# Patient Record
Sex: MALE | Race: WHITE | URBAN - METROPOLITAN AREA
[De-identification: names, ages, dates, MRNs, and addresses within clinical notes are randomized per-mention and may not be internally consistent; named-entity substitution may affect disease eponyms.]

---

## 2018-07-17 ENCOUNTER — OFFICE VISIT - RIVER FALLS (OUTPATIENT)
Dept: FAMILY MEDICINE | Facility: CLINIC | Age: 33
End: 2018-07-17

## 2018-07-17 ASSESSMENT — MIFFLIN-ST. JEOR: SCORE: 2417.24

## 2018-08-07 ENCOUNTER — OFFICE VISIT - RIVER FALLS (OUTPATIENT)
Dept: FAMILY MEDICINE | Facility: CLINIC | Age: 33
End: 2018-08-07

## 2018-08-07 ASSESSMENT — MIFFLIN-ST. JEOR: SCORE: 2449.89

## 2022-02-12 VITALS
BODY MASS INDEX: 47.74 KG/M2 | TEMPERATURE: 99.3 F | HEART RATE: 80 BPM | HEIGHT: 68 IN | DIASTOLIC BLOOD PRESSURE: 74 MMHG | SYSTOLIC BLOOD PRESSURE: 130 MMHG | TEMPERATURE: 97.6 F | WEIGHT: 315 LBS | BODY MASS INDEX: 47.74 KG/M2 | HEIGHT: 68 IN | HEART RATE: 68 BPM | DIASTOLIC BLOOD PRESSURE: 82 MMHG | WEIGHT: 315 LBS | SYSTOLIC BLOOD PRESSURE: 116 MMHG

## 2022-02-15 NOTE — PROGRESS NOTES
Patient:   GALA ROBERSON            MRN: [id number]            FIN: [id number]               Age:   32 years     Sex:  Male     :  1985   Associated Diagnoses:   Laryngitis   Author:   Charlie Taylor MD      Chief Complaint   2018 9:21 AM CDT    Lost voice x 2 days     Chief complaint and symptoms noted above confirmed with patient.      Subjective   Chief complaint 2018 9:21 AM CDT    Lost voice x 2 days   Noted as above. Discussed with Patient..  Additional information no fever or chills..        Health Status   Allergies:    Allergic Reactions (Selected)  Severity Not Documented  CeleBREX (Hives)   Medications:  (Selected)   Documented Medications  Documented  Jardiance: Oral, qam, 0 Refill(s), Type: Maintenance  Victoza: ( 1.2 mg ), Subcutaneous, daily, 0 Refill(s), Type: Maintenance  lisinopril: Oral, daily, 0 Refill(s), Type: Maintenance  metFORMIN: Oral, 0 Refill(s), Type: Maintenance   Problem list:    All Problems  Morbid obesity / SNOMED CT 904061887 / Probable      Objective   Vital Signs   2018 9:21 AM CDT Temperature Tympanic 97.6 DegF  LOW    Peripheral Pulse Rate 68 bpm    Pulse Site Radial artery    HR Method Manual    Systolic Blood Pressure 116 mmHg    Diastolic Blood Pressure 74 mmHg    Mean Arterial Pressure 88 mmHg    BP Site Left arm    BP Method Manual      Measurements from flowsheet : Measurements   2018 9:21 AM CDT Height Measured - Standard 68 in    Weight Measured - Standard 334.6 lb    BSA 2.7 m2    Body Mass Index 50.87 kg/m2  HI      General:  Alert and oriented, No acute distress.    Eye:  Normal conjunctiva.    HENT:  Normocephalic, Tympanic membranes are clear, Normal hearing, Oral mucosa is moist, No pharyngeal erythema.    Neck:  Supple, Non-tender.    Respiratory:  Lungs are clear to auscultation, Respirations are non-labored.    Cardiovascular:  Normal rate, Regular rhythm.       Impression and Plan   Assessment and Plan:          Diagnosis:  Laryngitis (OQG85-MK J04.0).    Orders     Orders   Pharmacy:  Asmanex  mcg/inh inhalation aerosol (Prescribe): ( 400 mcg ), Inhale, bid, # 1 EA, 0 Refill(s), Type: Maintenance, samples given to patient (Rx).     RTC Friday if not resolved.     .

## 2022-02-15 NOTE — PROGRESS NOTES
Patient:   GALA ROBERSON            MRN: [id number]            FIN: [id number]               Age:   32 years     Sex:  Male     :  1985   Associated Diagnoses:   Encounter for examination required by Department of Transportation (DOT)   Author:   Charlie Taylor MD      Results Review   General results   Today's results   2018 2:16 PM CDT Urine Color Urine Dipstick Yellow    Urine Appearance Urine Dipstick Clear    pH Urine Dipstick 5.5    Specific Gravity Urine Dipstick 1.025    Glucose Urine Dipstick 250 mg/dl    Bilirubin Urine Dipstick Negative    Ketones Urine Dipstick Negative    Blood Urine Dipstick Negative    Protein Urine Dipstick Negative    Nitrite Urine Dipstick Negative    Leukocyte Esterase Urine Dipstick Negative    Urobilinogen Urine Dipstick 1 mg/dl    POC Test Comments POC Test Comments    Urine Dipstick POC Form Urine Dipstick POC Form    POC Testing Form Urine Dipstick POC   2018 1:58 PM CDT Height Measured - Standard 68 in    Weight Measured - Standard 341.8 lb    BSA 2.72 m2    Body Mass Index 51.96 kg/m2  HI    Temperature Tympanic 99.3 DegF    Peripheral Pulse Rate 80 bpm    Pulse Site Radial artery    HR Method Manual    Systolic Blood Pressure 130 mmHg    Diastolic Blood Pressure 82 mmHg  HI    Mean Arterial Pressure 98 mmHg    BP Site Right arm    BP Method Manual    Allergies Verified? Yes    Medication History Verified? Yes    Medical History Verified? Yes    Tobacco Use? Never smoker    Pre-Visit Planning Status Completed    Corrective Lenses Glasses    Color Blind Correct Plates 14/14    Eye, Right w/Correction Visual Acuity 20/13    Eye, Left w/Correction Visual Acuity 20/13    Eye, Bilat w/Correction Visual Acuity 20/10    Chief Complaint Occ Health: DOT Px; UA    Consent for Immunization Exchange Consent Granted    Consent for Immunizations to Providers Consent Granted    Comprehensive Intake Form Comprehensive Intake Form    Intake Form Comprehensive Intake          Health Status   Allergies:    Allergic Reactions (Selected)  Severity Not Documented  CeleBREX (Hives)   Medications:  (Selected)   Prescriptions  Prescribed  Asmanex  mcg/inh inhalation aerosol: ( 400 mcg ), Inhale, bid, # 1 EA, 0 Refill(s), Type: Maintenance, samples given to patient (Rx)  Documented Medications  Documented  Jardiance: Oral, qam, 0 Refill(s), Type: Maintenance  Victoza: ( 1.2 mg ), Subcutaneous, daily, 0 Refill(s), Type: Maintenance  lisinopril: Oral, daily, 0 Refill(s), Type: Maintenance  metFORMIN: Oral, 0 Refill(s), Type: Maintenance   Problem list:    All Problems  Morbid obesity / SNOMED CT 391158611 / Probable      Subjective   Problem:  Please see scanned in copy of DOT physical      Objective   Vital Signs   8/7/2018 1:58 PM CDT Temperature Tympanic 99.3 DegF    Peripheral Pulse Rate 80 bpm    Pulse Site Radial artery    HR Method Manual    Systolic Blood Pressure 130 mmHg    Diastolic Blood Pressure 82 mmHg  HI    Mean Arterial Pressure 98 mmHg    BP Site Right arm    BP Method Manual         Plan   Impression and Plan:     Diagnosis     Encounter for examination required by Department of Transportation (DOT) (UQM11-IT Z02.89).     .    Orders     DOT FORM FILLED OUT FOR 1 year due to DM..     .